# Patient Record
Sex: FEMALE | Race: WHITE
[De-identification: names, ages, dates, MRNs, and addresses within clinical notes are randomized per-mention and may not be internally consistent; named-entity substitution may affect disease eponyms.]

---

## 2021-04-02 ENCOUNTER — HOSPITAL ENCOUNTER (OUTPATIENT)
Dept: HOSPITAL 47 - RADCTMAIN | Age: 81
Discharge: HOME | End: 2021-04-02
Attending: THORACIC SURGERY (CARDIOTHORACIC VASCULAR SURGERY)
Payer: MEDICARE

## 2021-04-02 DIAGNOSIS — I70.0: ICD-10-CM

## 2021-04-02 DIAGNOSIS — R55: ICD-10-CM

## 2021-04-02 DIAGNOSIS — R91.1: ICD-10-CM

## 2021-04-02 DIAGNOSIS — J90: Primary | ICD-10-CM

## 2021-04-02 PROCEDURE — 93880 EXTRACRANIAL BILAT STUDY: CPT

## 2021-04-02 PROCEDURE — 71250 CT THORAX DX C-: CPT

## 2021-04-02 NOTE — CT
EXAMINATION TYPE: CT chest wo con

 

DATE OF EXAM: 4/2/2021

 

COMPARISON: None

 

HISTORY: atherosclerosis of aorta

 

CT DLP: 256 mGycm, Automated exposure control for dose reduction was used.

 

CONTRAST: Performed injected with 0 mL of Isovue 300.

 

TECHNIQUE: Axial images were obtained at 5 mm thick sections.  Reconstructed images are reviewed on [email or web address] computer in the coronal plane. 

 

FINDINGS: Portion of the thyroid visualized is normal.

 

There is a 0.3 cm nodule within the periphery of the left upper lobe. Series 4 image 21. This may be 
calcified. Monitoring is recommended.

 

Minimal bilateral pleural effusions are present.

 

 There are scattered small superior mediastinal lymph nodes. A few scattered present tracheal lymph n
odes are evident. There is calcification of the tracheobronchial tree. Calcifications within coronary
 vessels. The ascending aorta diameter at the level of the main pulmonary artery is 3.1 cm.  The main
 pulmonary artery diameter at the bifurcation is 3.2 cm.

 

Limited CT sections are obtained through the upper abdomen. Abdomen is essentially unremarkable.

 

IMPRESSIONS:

1. Minimal bilateral pleural effusions.

2. 0.4 cm nodule periphery left upper lobe. Follow-up exam in 6 months to confirm stability is recomm
ended.

## 2021-04-02 NOTE — US
EXAMINATION TYPE: US carotid duplex BILAT

 

DATE OF EXAM: 4/2/2021

 

COMPARISON: NONE

 

CLINICAL HISTORY: I70.0 Atherosclerosis of aorta,R55 Syncope. hx stroke. pre op.

 

EXAM MEASUREMENTS: 

 

RIGHT:  Peak Systolic Velocity (PSV) cm/sec

----- Right CCA:  76.1  

----- Right ICA:  76.1     

----- Right ECA:  66.1   

ICA/CCA ratio:  1.0    

 

RIGHT:  End Diastole cm/sec

----- Right CCA:  15.6   

----- Right ICA:  14.3      

----- Right ECA:  0     

 

LEFT:  Peak Systolic Velocity (PSV) cm/sec

----- Left CCA:  88.4  

----- Left ICA:  105.0   

----- Left ECA:  78.8  

ICA/CCA ratio:  1.2  

 

LEFT:  End Diastole cm/sec

----- Left CCA:  15.4  

----- Left ICA:  24.5   

----- Left ECA:  2.7 

 

VERTEBRALS (direction of flow):

Right Vertebral: Antegrade

Left Vertebral: Antegrade

 

Rhythm:  Arrhythmia

 

No elevated velocities.  No significant stenosis.  Small amount of plaque seen in right  bulb and pro
ximal right CCA.   

 

Grayscale, color Doppler, spectral Doppler imaging performed of the carotid arteries. Waveform analys
is does not show significant stenosis of the internal carotid arteries.

 

IMPRESSION:  No hemodynamic significant stenosis of the proximal internal carotid arteries by Doppler
 criteria, an indirect measurement of carotid stenosis   

 

 

Criteria for Assigning % of Stenosis / Diameter reduction

(Estimation based on the indirect measurements of the internal carotid artery velocities (ICA PSV).

1.  Normal (no stenosis)=ICA PSV < 125 cm/s: ratio < 2.0: ICA EDV<40 cm/s.

2. Less than 50% stenosis=ICA PSV < 125 cm/s: ratio < 2.0: ICA EDV<40 cm/s.

3.  50 to 69% stenosis=ICA PSV of 125 to 230 cm/s: ration 2.0 ? 4.0: ICA EDV  cm/s.

4.  Greater than 70% stenosis to near occlusion= ICA PSV > 230 cm/s: ratio > 4.0: ICA EDV > 100 cm/s.
 

5.  Near occlusion= ICA PSV velocities may be low or undetectable: variable ratio and ICA EDV.

6.  Total occlusion=unable to detect flow.

## 2021-04-13 ENCOUNTER — HOSPITAL ENCOUNTER (OUTPATIENT)
Dept: HOSPITAL 47 - LABPAT | Age: 81
Discharge: HOME | End: 2021-04-13
Attending: THORACIC SURGERY (CARDIOTHORACIC VASCULAR SURGERY)
Payer: MEDICARE

## 2021-04-13 DIAGNOSIS — Z01.810: Primary | ICD-10-CM

## 2021-04-13 DIAGNOSIS — I34.2: ICD-10-CM

## 2021-04-13 DIAGNOSIS — Z79.01: ICD-10-CM

## 2021-04-13 DIAGNOSIS — I25.10: ICD-10-CM

## 2021-04-13 DIAGNOSIS — I36.1: ICD-10-CM

## 2021-04-13 DIAGNOSIS — Z79.899: ICD-10-CM

## 2021-04-13 DIAGNOSIS — E11.9: ICD-10-CM

## 2021-04-13 LAB
ALBUMIN SERPL-MCNC: 4.4 G/DL (ref 3.5–5)
ALP SERPL-CCNC: 97 U/L (ref 38–126)
ALT SERPL-CCNC: 62 U/L (ref 4–34)
ANION GAP SERPL CALC-SCNC: 12 MMOL/L
APTT BLD: 24 SEC (ref 22–30)
AST SERPL-CCNC: 64 U/L (ref 14–36)
BUN SERPL-SCNC: 24 MG/DL (ref 7–17)
CALCIUM SPEC-MCNC: 9.4 MG/DL (ref 8.4–10.2)
CHLORIDE SERPL-SCNC: 102 MMOL/L (ref 98–107)
CHOLEST SERPL-MCNC: 109 MG/DL (ref ?–200)
CO2 SERPL-SCNC: 25 MMOL/L (ref 22–30)
ERYTHROCYTE [DISTWIDTH] IN BLOOD BY AUTOMATED COUNT: 4.39 M/UL (ref 3.8–5.4)
ERYTHROCYTE [DISTWIDTH] IN BLOOD: 18.4 % (ref 11.5–15.5)
GLUCOSE SERPL-MCNC: 333 MG/DL (ref 74–99)
GLUCOSE UR QL: (no result)
HBA1C MFR BLD: 8 % (ref 4–6)
HCT VFR BLD AUTO: 32.7 % (ref 34–46)
HDLC SERPL-MCNC: 25 MG/DL (ref 40–60)
HGB BLD-MCNC: 10 GM/DL (ref 11.4–16)
HYALINE CASTS UR QL AUTO: 4 /LPF (ref 0–2)
INR PPP: 1.1 (ref ?–1.2)
LDLC SERPL CALC-MCNC: 52 MG/DL (ref 0–99)
MAGNESIUM SPEC-SCNC: 1.1 MG/DL (ref 1.6–2.3)
MCH RBC QN AUTO: 22.8 PG (ref 25–35)
MCHC RBC AUTO-ENTMCNC: 30.6 G/DL (ref 31–37)
MCV RBC AUTO: 74.6 FL (ref 80–100)
PH UR: 6 [PH] (ref 5–8)
PLATELET # BLD AUTO: 370 K/UL (ref 150–450)
POTASSIUM SERPL-SCNC: 4.6 MMOL/L (ref 3.5–5.1)
PROT SERPL-MCNC: 6.9 G/DL (ref 6.3–8.2)
PT BLD: 12 SEC (ref 9–12)
RBC UR QL: 1 /HPF (ref 0–5)
SODIUM SERPL-SCNC: 139 MMOL/L (ref 137–145)
SP GR UR: 1.02 (ref 1–1.03)
SQUAMOUS UR QL AUTO: 1 /HPF (ref 0–4)
TRIGL SERPL-MCNC: 161 MG/DL (ref ?–150)
UROBILINOGEN UR QL STRIP: <2 MG/DL (ref ?–2)
WBC # BLD AUTO: 10.9 K/UL (ref 3.8–10.6)
WBC #/AREA URNS HPF: 21 /HPF (ref 0–5)

## 2021-04-13 PROCEDURE — 81001 URINALYSIS AUTO W/SCOPE: CPT

## 2021-04-13 PROCEDURE — 80061 LIPID PANEL: CPT

## 2021-04-13 PROCEDURE — 83735 ASSAY OF MAGNESIUM: CPT

## 2021-04-13 PROCEDURE — 80074 ACUTE HEPATITIS PANEL: CPT

## 2021-04-13 PROCEDURE — 87086 URINE CULTURE/COLONY COUNT: CPT

## 2021-04-13 PROCEDURE — 86901 BLOOD TYPING SEROLOGIC RH(D): CPT

## 2021-04-13 PROCEDURE — 36415 COLL VENOUS BLD VENIPUNCTURE: CPT

## 2021-04-13 PROCEDURE — 85027 COMPLETE CBC AUTOMATED: CPT

## 2021-04-13 PROCEDURE — 93005 ELECTROCARDIOGRAM TRACING: CPT

## 2021-04-13 PROCEDURE — 83036 HEMOGLOBIN GLYCOSYLATED A1C: CPT

## 2021-04-13 PROCEDURE — 86920 COMPATIBILITY TEST SPIN: CPT

## 2021-04-13 PROCEDURE — 85610 PROTHROMBIN TIME: CPT

## 2021-04-13 PROCEDURE — 87070 CULTURE OTHR SPECIMN AEROBIC: CPT

## 2021-04-13 PROCEDURE — 84443 ASSAY THYROID STIM HORMONE: CPT

## 2021-04-13 PROCEDURE — 85730 THROMBOPLASTIN TIME PARTIAL: CPT

## 2021-04-13 PROCEDURE — 86850 RBC ANTIBODY SCREEN: CPT

## 2021-04-13 PROCEDURE — 86900 BLOOD TYPING SEROLOGIC ABO: CPT

## 2021-04-13 PROCEDURE — 93970 EXTREMITY STUDY: CPT

## 2021-04-13 PROCEDURE — 93922 UPR/L XTREMITY ART 2 LEVELS: CPT

## 2021-04-13 PROCEDURE — 80053 COMPREHEN METABOLIC PANEL: CPT

## 2021-04-14 NOTE — P.VSCSTY
Greater Saphenous Vein Mapping





This is bilateral lower extremity greater saphenous vein mapping.





Date of service: 04/13/2021





Vein quality and ultrasound appearance: We see no intraluminal thrombus or wall 

changes.  Below mid thigh on the left appears small for use as conduit as well 

as some areas on the right at the knee and below..





Vein size  groin right : 5.0 x 4.1 groin left: 6.1 x 5.5





                High thigh right: 3.2 x 2.4 high thigh left: 3.9 x 3.8





                Mid thigh right: 3.2 x 2.4 mid thigh left: 2.3 x 3.9





                 Above-knee right: 3.2 x 3.1 above-knee left: 1.8 x 1.7





                  Below knee right: 3.0 x 2.0 below-knee left: 2.4 x 1.6





                   Mid calf right: 2.0 x 2.0 mid calf left: 1.8 x 1.4





                    Ankle right: 2.5 x 1.7 ankle left: 1.8 x 1.2





Impression: Areas of usable saphenous vein.  Most of right leg as well as mid 

thigh and above on the left.

## 2021-04-19 ENCOUNTER — HOSPITAL ENCOUNTER (OUTPATIENT)
Dept: HOSPITAL 47 - 2ORMAIN | Age: 81
Discharge: HOME | End: 2021-04-19
Attending: THORACIC SURGERY (CARDIOTHORACIC VASCULAR SURGERY)
Payer: MEDICARE

## 2021-04-19 VITALS
DIASTOLIC BLOOD PRESSURE: 76 MMHG | RESPIRATION RATE: 16 BRPM | SYSTOLIC BLOOD PRESSURE: 141 MMHG | TEMPERATURE: 97.6 F | HEART RATE: 83 BPM

## 2021-04-19 VITALS — BODY MASS INDEX: 20.5 KG/M2

## 2021-04-19 DIAGNOSIS — I34.2: Primary | ICD-10-CM

## 2021-04-19 DIAGNOSIS — I25.10: ICD-10-CM

## 2021-04-19 DIAGNOSIS — I36.1: ICD-10-CM

## 2021-04-19 DIAGNOSIS — Z53.09: ICD-10-CM

## 2021-04-19 LAB
ALBUMIN SERPL-MCNC: 4.1 G/DL (ref 3.5–5)
ALP SERPL-CCNC: 107 U/L (ref 38–126)
ALT SERPL-CCNC: 40 U/L (ref 4–34)
ANION GAP SERPL CALC-SCNC: 11 MMOL/L
AST SERPL-CCNC: 31 U/L (ref 14–36)
BUN SERPL-SCNC: 31 MG/DL (ref 7–17)
CALCIUM SPEC-MCNC: 9.5 MG/DL (ref 8.4–10.2)
CHLORIDE SERPL-SCNC: 102 MMOL/L (ref 98–107)
CO2 SERPL-SCNC: 26 MMOL/L (ref 22–30)
ERYTHROCYTE [DISTWIDTH] IN BLOOD BY AUTOMATED COUNT: 4.28 M/UL (ref 3.8–5.4)
ERYTHROCYTE [DISTWIDTH] IN BLOOD: 18.4 % (ref 11.5–15.5)
GLUCOSE BLD-MCNC: 203 MG/DL (ref 75–99)
GLUCOSE SERPL-MCNC: 198 MG/DL (ref 74–99)
HCT VFR BLD AUTO: 30.5 % (ref 34–46)
HGB BLD-MCNC: 9.9 GM/DL (ref 11.4–16)
MAGNESIUM SPEC-SCNC: 1.5 MG/DL (ref 1.6–2.3)
MCH RBC QN AUTO: 23.1 PG (ref 25–35)
MCHC RBC AUTO-ENTMCNC: 32.4 G/DL (ref 31–37)
MCV RBC AUTO: 71.3 FL (ref 80–100)
PLATELET # BLD AUTO: 307 K/UL (ref 150–450)
POTASSIUM SERPL-SCNC: 4.3 MMOL/L (ref 3.5–5.1)
PROT SERPL-MCNC: 6.7 G/DL (ref 6.3–8.2)
SODIUM SERPL-SCNC: 139 MMOL/L (ref 137–145)
WBC # BLD AUTO: 9.7 K/UL (ref 3.8–10.6)

## 2021-04-19 PROCEDURE — 86901 BLOOD TYPING SEROLOGIC RH(D): CPT

## 2021-04-19 PROCEDURE — 86920 COMPATIBILITY TEST SPIN: CPT

## 2021-04-19 PROCEDURE — 80053 COMPREHEN METABOLIC PANEL: CPT

## 2021-04-19 PROCEDURE — 86900 BLOOD TYPING SEROLOGIC ABO: CPT

## 2021-04-19 PROCEDURE — 86850 RBC ANTIBODY SCREEN: CPT

## 2021-04-19 PROCEDURE — 85027 COMPLETE CBC AUTOMATED: CPT

## 2021-04-19 PROCEDURE — 83735 ASSAY OF MAGNESIUM: CPT

## 2021-05-03 ENCOUNTER — HOSPITAL ENCOUNTER (OUTPATIENT)
Dept: HOSPITAL 47 - LABWHC1 | Age: 81
Discharge: HOME | End: 2021-05-03
Attending: THORACIC SURGERY (CARDIOTHORACIC VASCULAR SURGERY)
Payer: MEDICARE

## 2021-05-03 DIAGNOSIS — I34.0: ICD-10-CM

## 2021-05-03 DIAGNOSIS — R55: ICD-10-CM

## 2021-05-03 DIAGNOSIS — I25.10: Primary | ICD-10-CM

## 2021-05-03 LAB
ALBUMIN SERPL-MCNC: 4.1 G/DL (ref 3.5–5)
ALP SERPL-CCNC: 90 U/L (ref 38–126)
ALT SERPL-CCNC: 22 U/L (ref 4–34)
ANION GAP SERPL CALC-SCNC: 10 MMOL/L
APTT BLD: 22.3 SEC (ref 22–30)
AST SERPL-CCNC: 24 U/L (ref 14–36)
BUN SERPL-SCNC: 25 MG/DL (ref 7–17)
CALCIUM SPEC-MCNC: 9.9 MG/DL (ref 8.4–10.2)
CHLORIDE SERPL-SCNC: 104 MMOL/L (ref 98–107)
CO2 SERPL-SCNC: 26 MMOL/L (ref 22–30)
ERYTHROCYTE [DISTWIDTH] IN BLOOD BY AUTOMATED COUNT: 4.89 M/UL (ref 3.8–5.4)
ERYTHROCYTE [DISTWIDTH] IN BLOOD: 19.8 % (ref 11.5–15.5)
GLUCOSE SERPL-MCNC: 279 MG/DL (ref 74–99)
HCT VFR BLD AUTO: 37.6 % (ref 34–46)
HGB BLD-MCNC: 11.8 GM/DL (ref 11.4–16)
INR PPP: 1.1 (ref ?–1.2)
MAGNESIUM SPEC-SCNC: 1.3 MG/DL (ref 1.6–2.3)
MCH RBC QN AUTO: 24.2 PG (ref 25–35)
MCHC RBC AUTO-ENTMCNC: 31.4 G/DL (ref 31–37)
MCV RBC AUTO: 77 FL (ref 80–100)
PLATELET # BLD AUTO: 298 K/UL (ref 150–450)
POTASSIUM SERPL-SCNC: 4.8 MMOL/L (ref 3.5–5.1)
PROT SERPL-MCNC: 6.7 G/DL (ref 6.3–8.2)
PT BLD: 11.2 SEC (ref 9–12)
SODIUM SERPL-SCNC: 140 MMOL/L (ref 137–145)
WBC # BLD AUTO: 9.5 K/UL (ref 3.8–10.6)

## 2021-05-03 PROCEDURE — 85610 PROTHROMBIN TIME: CPT

## 2021-05-03 PROCEDURE — 85730 THROMBOPLASTIN TIME PARTIAL: CPT

## 2021-05-03 PROCEDURE — 83735 ASSAY OF MAGNESIUM: CPT

## 2021-05-03 PROCEDURE — 80053 COMPREHEN METABOLIC PANEL: CPT

## 2021-05-03 PROCEDURE — 85027 COMPLETE CBC AUTOMATED: CPT

## 2021-05-05 ENCOUNTER — HOSPITAL ENCOUNTER (INPATIENT)
Dept: HOSPITAL 47 - 2ORMAIN | Age: 81
DRG: 220 | End: 2021-05-05
Attending: THORACIC SURGERY (CARDIOTHORACIC VASCULAR SURGERY) | Admitting: THORACIC SURGERY (CARDIOTHORACIC VASCULAR SURGERY)
Payer: MEDICARE

## 2021-05-05 VITALS — TEMPERATURE: 99 F | DIASTOLIC BLOOD PRESSURE: 79 MMHG | SYSTOLIC BLOOD PRESSURE: 163 MMHG

## 2021-05-05 VITALS — RESPIRATION RATE: 30 BRPM | HEART RATE: 80 BPM

## 2021-05-05 VITALS — BODY MASS INDEX: 21 KG/M2

## 2021-05-05 DIAGNOSIS — I48.20: ICD-10-CM

## 2021-05-05 DIAGNOSIS — I25.2: ICD-10-CM

## 2021-05-05 DIAGNOSIS — K21.9: ICD-10-CM

## 2021-05-05 DIAGNOSIS — I25.10: ICD-10-CM
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
## 2021-05-05 DIAGNOSIS — E78.5: ICD-10-CM

## 2021-05-05 DIAGNOSIS — Z79.01: ICD-10-CM

## 2021-05-05 DIAGNOSIS — Z82.49: ICD-10-CM

## 2021-05-05 DIAGNOSIS — I05.2: Primary | ICD-10-CM

## 2021-05-05 DIAGNOSIS — I69.359: ICD-10-CM
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
## 2021-05-05 DIAGNOSIS — Z95.5: ICD-10-CM

## 2021-05-05 DIAGNOSIS — Z51.5: ICD-10-CM

## 2021-05-05 DIAGNOSIS — E87.2: ICD-10-CM

## 2021-05-05 DIAGNOSIS — Z88.5: ICD-10-CM

## 2021-05-05 DIAGNOSIS — Z20.822: ICD-10-CM

## 2021-05-05 DIAGNOSIS — Z66: ICD-10-CM

## 2021-05-05 DIAGNOSIS — R57.0: ICD-10-CM

## 2021-05-05 DIAGNOSIS — I97.418: ICD-10-CM

## 2021-05-05 DIAGNOSIS — I10: ICD-10-CM

## 2021-05-05 DIAGNOSIS — Z87.891: ICD-10-CM

## 2021-05-05 DIAGNOSIS — Z79.4: ICD-10-CM

## 2021-05-05 DIAGNOSIS — E11.9: ICD-10-CM

## 2021-05-05 DIAGNOSIS — D62: ICD-10-CM

## 2021-05-05 DIAGNOSIS — Z79.890: ICD-10-CM

## 2021-05-05 DIAGNOSIS — Z79.899: ICD-10-CM

## 2021-05-05 DIAGNOSIS — Z88.0: ICD-10-CM

## 2021-05-05 DIAGNOSIS — Z79.82: ICD-10-CM

## 2021-05-05 DIAGNOSIS — Z88.8: ICD-10-CM

## 2021-05-05 DIAGNOSIS — Z88.1: ICD-10-CM

## 2021-05-05 DIAGNOSIS — J44.9: ICD-10-CM

## 2021-05-05 LAB
ALBUMIN SERPL-MCNC: 1.6 G/DL (ref 3.5–5)
ALP SERPL-CCNC: 25 U/L (ref 38–126)
ALT SERPL-CCNC: 21 U/L (ref 4–34)
ANION GAP SERPL CALC-SCNC: 22 MMOL/L
APTT BLD: 109.7 SEC (ref 22–30)
AST SERPL-CCNC: 91 U/L (ref 14–36)
BUN SERPL-SCNC: 16 MG/DL (ref 7–17)
CA-I BLDA-MCNC: 2.4 MG/DL (ref 4.5–5.3)
CA-I BLDA-MCNC: 2.6 MG/DL (ref 4.5–5.3)
CA-I BLDA-MCNC: 3.9 MG/DL (ref 4.5–5.3)
CA-I BLDA-MCNC: 3.9 MG/DL (ref 4.5–5.3)
CA-I BLDA-MCNC: 4.1 MG/DL (ref 4.5–5.3)
CA-I BLDA-MCNC: 4.2 MG/DL (ref 4.5–5.3)
CA-I BLDA-MCNC: 4.3 MG/DL (ref 4.5–5.3)
CA-I BLDA-MCNC: 4.5 MG/DL (ref 4.5–5.3)
CA-I BLDA-MCNC: 4.5 MG/DL (ref 4.5–5.3)
CA-I BLDA-MCNC: 4.7 MG/DL (ref 4.5–5.3)
CA-I BLDA-MCNC: 4.8 MG/DL (ref 4.5–5.3)
CA-I BLDA-MCNC: 4.8 MG/DL (ref 4.5–5.3)
CALCIUM SPEC-MCNC: 5.7 MG/DL (ref 8.4–10.2)
CELLS COUNTED: 100
CHLORIDE SERPL-SCNC: 107 MMOL/L (ref 98–107)
CO2 BLDA-SCNC: 16 MMOL/L (ref 19–24)
CO2 BLDA-SCNC: 21 MMOL/L (ref 19–24)
CO2 BLDA-SCNC: 23 MMOL/L (ref 19–24)
CO2 BLDA-SCNC: 23 MMOL/L (ref 19–24)
CO2 BLDA-SCNC: 24 MMOL/L (ref 19–24)
CO2 BLDA-SCNC: 24 MMOL/L (ref 19–24)
CO2 BLDA-SCNC: 25 MMOL/L (ref 19–24)
CO2 BLDA-SCNC: 26 MMOL/L (ref 19–24)
CO2 BLDA-SCNC: 27 MMOL/L (ref 19–24)
CO2 BLDA-SCNC: 27 MMOL/L (ref 19–24)
CO2 BLDA-SCNC: 28 MMOL/L (ref 19–24)
CO2 SERPL-SCNC: 19 MMOL/L (ref 22–30)
DIGOXIN SERPL-MCNC: 0.7 NG/ML
EOSINOPHIL # BLD MANUAL: 0.22 K/UL (ref 0–0.7)
ERYTHROCYTE [DISTWIDTH] IN BLOOD BY AUTOMATED COUNT: 3.24 M/UL (ref 3.8–5.4)
ERYTHROCYTE [DISTWIDTH] IN BLOOD: 18.9 % (ref 11.5–15.5)
GLUCOSE BLD-MCNC: 147 MG/DL (ref 75–99)
GLUCOSE BLD-MCNC: 421 MG/DL (ref 75–99)
GLUCOSE BLD-MCNC: 430 MG/DL (ref 75–99)
GLUCOSE BLDA-MCNC: 158 MG/DL (ref 75–99)
GLUCOSE BLDA-MCNC: 184 MG/DL (ref 75–99)
GLUCOSE BLDA-MCNC: 187 MG/DL (ref 75–99)
GLUCOSE BLDA-MCNC: 193 MG/DL (ref 75–99)
GLUCOSE BLDA-MCNC: 195 MG/DL (ref 75–99)
GLUCOSE BLDA-MCNC: 198 MG/DL (ref 75–99)
GLUCOSE BLDA-MCNC: 199 MG/DL (ref 75–99)
GLUCOSE BLDA-MCNC: 208 MG/DL (ref 75–99)
GLUCOSE BLDA-MCNC: 211 MG/DL (ref 75–99)
GLUCOSE BLDA-MCNC: 217 MG/DL (ref 75–99)
GLUCOSE BLDA-MCNC: 242 MG/DL (ref 75–99)
GLUCOSE BLDA-MCNC: 248 MG/DL (ref 75–99)
GLUCOSE BLDA-MCNC: 257 MG/DL (ref 75–99)
GLUCOSE BLDA-MCNC: 261 MG/DL (ref 75–99)
GLUCOSE BLDA-MCNC: 366 MG/DL (ref 75–99)
GLUCOSE SERPL-MCNC: 370 MG/DL (ref 74–99)
HCO3 BLDA-SCNC: 15 MMOL/L (ref 21–25)
HCO3 BLDA-SCNC: 20 MMOL/L (ref 21–25)
HCO3 BLDA-SCNC: 22 MMOL/L (ref 21–25)
HCO3 BLDA-SCNC: 23 MMOL/L (ref 21–25)
HCO3 BLDA-SCNC: 24 MMOL/L (ref 21–25)
HCO3 BLDA-SCNC: 25 MMOL/L (ref 21–25)
HCO3 BLDA-SCNC: 26 MMOL/L (ref 21–25)
HCO3 BLDA-SCNC: 26 MMOL/L (ref 21–25)
HCT VFR BLD AUTO: 28.2 % (ref 34–46)
HCT VFR BLDA CALC: 20 % (ref 34–46)
HCT VFR BLDA CALC: 21 % (ref 34–46)
HCT VFR BLDA CALC: 22 % (ref 34–46)
HCT VFR BLDA CALC: 22 % (ref 34–46)
HCT VFR BLDA CALC: 23 % (ref 34–46)
HCT VFR BLDA CALC: 23 % (ref 34–46)
HCT VFR BLDA CALC: 24 % (ref 34–46)
HCT VFR BLDA CALC: 25 % (ref 34–46)
HCT VFR BLDA CALC: 26 % (ref 34–46)
HCT VFR BLDA CALC: 26 % (ref 34–46)
HCT VFR BLDA CALC: 28 % (ref 34–46)
HCT VFR BLDA CALC: 31 % (ref 34–46)
HCT VFR BLDA CALC: 31 % (ref 34–46)
HCT VFR BLDA CALC: 32 % (ref 34–46)
HGB BLD-MCNC: 9.1 GM/DL (ref 11.4–16)
INR PPP: 2 (ref ?–1.2)
LACTATE BLDA-MCNC: 1.7 MMOL/L (ref 0.5–1.6)
LACTATE BLDA-MCNC: 1.7 MMOL/L (ref 0.5–1.6)
LACTATE BLDA-MCNC: 10.8 MMOL/L (ref 0.5–1.6)
LACTATE BLDA-MCNC: 13.5 MMOL/L (ref 0.5–1.6)
LACTATE BLDA-MCNC: 2.2 MMOL/L (ref 0.5–1.6)
LACTATE BLDA-MCNC: 2.7 MMOL/L (ref 0.5–1.6)
LACTATE BLDA-MCNC: 2.8 MMOL/L (ref 0.5–1.6)
LACTATE BLDA-MCNC: 3 MMOL/L (ref 0.5–1.6)
LACTATE BLDA-MCNC: 3.1 MMOL/L (ref 0.5–1.6)
LACTATE BLDA-MCNC: 3.1 MMOL/L (ref 0.5–1.6)
LACTATE BLDA-MCNC: 3.3 MMOL/L (ref 0.5–1.6)
LACTATE BLDA-MCNC: 3.3 MMOL/L (ref 0.5–1.6)
LACTATE BLDA-MCNC: 3.4 MMOL/L (ref 0.5–1.6)
LACTATE BLDA-MCNC: 5.7 MMOL/L (ref 0.5–1.6)
LACTATE BLDA-MCNC: 7.5 MMOL/L (ref 0.5–1.6)
LYMPHOCYTES # BLD MANUAL: 4.29 K/UL (ref 1–4.8)
MAGNESIUM SPEC-SCNC: 1.5 MG/DL (ref 1.6–2.3)
MAGNESIUM SPEC-SCNC: 3.5 MG/DL (ref 1.6–2.3)
MCH RBC QN AUTO: 28.1 PG (ref 25–35)
MCHC RBC AUTO-ENTMCNC: 32.3 G/DL (ref 31–37)
MCV RBC AUTO: 87.1 FL (ref 80–100)
METAMYELOCYTES # BLD: 0.22 K/UL
MONOCYTES # BLD MANUAL: 0.22 K/UL (ref 0–1)
NEUTROPHILS NFR BLD MANUAL: 41 %
NEUTS SEG # BLD MANUAL: 6 K/UL (ref 1.3–7.7)
PCO2 BLDA: 33 MMHG (ref 35–45)
PCO2 BLDA: 35 MMHG (ref 35–45)
PCO2 BLDA: 35 MMHG (ref 35–45)
PCO2 BLDA: 38 MMHG (ref 35–45)
PCO2 BLDA: 40 MMHG (ref 35–45)
PCO2 BLDA: 41 MMHG (ref 35–45)
PCO2 BLDA: 41 MMHG (ref 35–45)
PCO2 BLDA: 42 MMHG (ref 35–45)
PCO2 BLDA: 45 MMHG (ref 35–45)
PCO2 BLDA: 45 MMHG (ref 35–45)
PCO2 BLDA: 53 MMHG (ref 35–45)
PCO2 BLDA: 54 MMHG (ref 35–45)
PCO2 BLDA: 57 MMHG (ref 35–45)
PCO2 BLDA: 66 MMHG (ref 35–45)
PH BLDA: 7.05 [PH] (ref 7.35–7.45)
PH BLDA: 7.18 [PH] (ref 7.35–7.45)
PH BLDA: 7.27 [PH] (ref 7.35–7.45)
PH BLDA: 7.29 [PH] (ref 7.35–7.45)
PH BLDA: 7.32 [PH] (ref 7.35–7.45)
PH BLDA: 7.32 [PH] (ref 7.35–7.45)
PH BLDA: 7.33 [PH] (ref 7.35–7.45)
PH BLDA: 7.35 [PH] (ref 7.35–7.45)
PH BLDA: 7.36 [PH] (ref 7.35–7.45)
PH BLDA: 7.38 [PH] (ref 7.35–7.45)
PH BLDA: 7.38 [PH] (ref 7.35–7.45)
PH BLDA: 7.39 [PH] (ref 7.35–7.45)
PH BLDA: 7.41 [PH] (ref 7.35–7.45)
PH BLDA: 7.44 [PH] (ref 7.35–7.45)
PH BLDA: 7.45 [PH] (ref 7.35–7.45)
PLATELET # BLD AUTO: 130 K/UL (ref 150–450)
PO2 BLDA: 322 MMHG (ref 83–108)
PO2 BLDA: 379 MMHG (ref 83–108)
PO2 BLDA: 381 MMHG (ref 83–108)
PO2 BLDA: 401 MMHG (ref 83–108)
PO2 BLDA: 408 MMHG (ref 83–108)
PO2 BLDA: 412 MMHG (ref 83–108)
PO2 BLDA: 57 MMHG (ref 83–108)
PO2 BLDA: 91 MMHG (ref 83–108)
PO2 BLDA: >420 MMHG (ref 83–108)
POTASSIUM BLDA-SCNC: 3.2 MMOL/L (ref 3.4–4.5)
POTASSIUM BLDA-SCNC: 3.3 MMOL/L (ref 3.4–4.5)
POTASSIUM BLDA-SCNC: 3.5 MMOL/L (ref 3.4–4.5)
POTASSIUM BLDA-SCNC: 3.6 MMOL/L (ref 3.4–4.5)
POTASSIUM BLDA-SCNC: 3.7 MMOL/L (ref 3.4–4.5)
POTASSIUM BLDA-SCNC: 3.8 MMOL/L (ref 3.4–4.5)
POTASSIUM BLDA-SCNC: 3.9 MMOL/L (ref 3.4–4.5)
POTASSIUM BLDA-SCNC: 4 MMOL/L (ref 3.4–4.5)
POTASSIUM BLDA-SCNC: 4.1 MMOL/L (ref 3.4–4.5)
POTASSIUM BLDA-SCNC: 4.2 MMOL/L (ref 3.4–4.5)
POTASSIUM BLDA-SCNC: 4.5 MMOL/L (ref 3.4–4.5)
POTASSIUM BLDA-SCNC: 4.6 MMOL/L (ref 3.4–4.5)
POTASSIUM BLDA-SCNC: 4.7 MMOL/L (ref 3.4–4.5)
POTASSIUM BLDA-SCNC: 4.8 MMOL/L (ref 3.4–4.5)
POTASSIUM BLDA-SCNC: 5 MMOL/L (ref 3.4–4.5)
POTASSIUM SERPL-SCNC: 3.2 MMOL/L (ref 3.5–5.1)
PROT SERPL-MCNC: 2.8 G/DL (ref 6.3–8.2)
PT BLD: 19.3 SEC (ref 9–12)
SODIUM BLDA-SCNC: 132 MMOL/L (ref 135–146)
SODIUM BLDA-SCNC: 134 MMOL/L (ref 135–146)
SODIUM BLDA-SCNC: 135 MMOL/L (ref 135–146)
SODIUM BLDA-SCNC: 136 MMOL/L (ref 135–146)
SODIUM BLDA-SCNC: 137 MMOL/L (ref 135–146)
SODIUM BLDA-SCNC: 137 MMOL/L (ref 135–146)
SODIUM BLDA-SCNC: 138 MMOL/L (ref 135–146)
SODIUM BLDA-SCNC: 138 MMOL/L (ref 135–146)
SODIUM BLDA-SCNC: 139 MMOL/L (ref 135–146)
SODIUM BLDA-SCNC: 139 MMOL/L (ref 135–146)
SODIUM BLDA-SCNC: 140 MMOL/L (ref 135–146)
SODIUM BLDA-SCNC: 143 MMOL/L (ref 135–146)
SODIUM BLDA-SCNC: 148 MMOL/L (ref 135–146)
SODIUM SERPL-SCNC: 148 MMOL/L (ref 137–145)
WBC # BLD AUTO: 11 K/UL (ref 3.8–10.6)

## 2021-05-05 PROCEDURE — 86920 COMPATIBILITY TEST SPIN: CPT

## 2021-05-05 PROCEDURE — 3E033XZ INTRODUCTION OF VASOPRESSOR INTO PERIPHERAL VEIN, PERCUTANEOUS APPROACH: ICD-10-PCS

## 2021-05-05 PROCEDURE — 5A1221Z PERFORMANCE OF CARDIAC OUTPUT, CONTINUOUS: ICD-10-PCS

## 2021-05-05 PROCEDURE — 30243R1 TRANSFUSION OF NONAUTOLOGOUS PLATELETS INTO CENTRAL VEIN, PERCUTANEOUS APPROACH: ICD-10-PCS

## 2021-05-05 PROCEDURE — 30243K1 TRANSFUSION OF NONAUTOLOGOUS FROZEN PLASMA INTO CENTRAL VEIN, PERCUTANEOUS APPROACH: ICD-10-PCS

## 2021-05-05 PROCEDURE — 02L70CK OCCLUSION OF LEFT ATRIAL APPENDAGE WITH EXTRALUMINAL DEVICE, OPEN APPROACH: ICD-10-PCS

## 2021-05-05 PROCEDURE — 86901 BLOOD TYPING SEROLOGIC RH(D): CPT

## 2021-05-05 PROCEDURE — 86891 AUTOLOGOUS BLOOD OP SALVAGE: CPT

## 2021-05-05 PROCEDURE — 82805 BLOOD GASES W/O2 SATURATION: CPT

## 2021-05-05 PROCEDURE — 06BP4ZZ EXCISION OF RIGHT SAPHENOUS VEIN, PERCUTANEOUS ENDOSCOPIC APPROACH: ICD-10-PCS

## 2021-05-05 PROCEDURE — 85730 THROMBOPLASTIN TIME PARTIAL: CPT

## 2021-05-05 PROCEDURE — 85520 HEPARIN ASSAY: CPT

## 2021-05-05 PROCEDURE — 88311 DECALCIFY TISSUE: CPT

## 2021-05-05 PROCEDURE — 80053 COMPREHEN METABOLIC PANEL: CPT

## 2021-05-05 PROCEDURE — 0W380ZZ CONTROL BLEEDING IN CHEST WALL, OPEN APPROACH: ICD-10-PCS

## 2021-05-05 PROCEDURE — 36430 TRANSFUSION BLD/BLD COMPNT: CPT

## 2021-05-05 PROCEDURE — 83735 ASSAY OF MAGNESIUM: CPT

## 2021-05-05 PROCEDURE — 80162 ASSAY OF DIGOXIN TOTAL: CPT

## 2021-05-05 PROCEDURE — 88305 TISSUE EXAM BY PATHOLOGIST: CPT

## 2021-05-05 PROCEDURE — B24BZZ4 ULTRASONOGRAPHY OF HEART WITH AORTA, TRANSESOPHAGEAL: ICD-10-PCS

## 2021-05-05 PROCEDURE — 30243N1 TRANSFUSION OF NONAUTOLOGOUS RED BLOOD CELLS INTO CENTRAL VEIN, PERCUTANEOUS APPROACH: ICD-10-PCS

## 2021-05-05 PROCEDURE — 86900 BLOOD TYPING SEROLOGIC ABO: CPT

## 2021-05-05 PROCEDURE — 85027 COMPLETE CBC AUTOMATED: CPT

## 2021-05-05 PROCEDURE — 82330 ASSAY OF CALCIUM: CPT

## 2021-05-05 PROCEDURE — 021009W BYPASS CORONARY ARTERY, ONE ARTERY FROM AORTA WITH AUTOLOGOUS VENOUS TISSUE, OPEN APPROACH: ICD-10-PCS

## 2021-05-05 PROCEDURE — 85610 PROTHROMBIN TIME: CPT

## 2021-05-05 PROCEDURE — 86850 RBC ANTIBODY SCREEN: CPT

## 2021-05-05 PROCEDURE — 85025 COMPLETE CBC W/AUTO DIFF WBC: CPT

## 2021-05-05 PROCEDURE — 02PA08Z REMOVAL OF ZOOPLASTIC TISSUE FROM HEART, OPEN APPROACH: ICD-10-PCS

## 2021-05-05 PROCEDURE — 02RG08Z REPLACEMENT OF MITRAL VALVE WITH ZOOPLASTIC TISSUE, OPEN APPROACH: ICD-10-PCS

## 2021-05-05 PROCEDURE — 5A02210 ASSISTANCE WITH CARDIAC OUTPUT USING BALLOON PUMP, CONTINUOUS: ICD-10-PCS

## 2021-05-05 PROCEDURE — 87635 SARS-COV-2 COVID-19 AMP PRB: CPT

## 2021-05-05 PROCEDURE — 02UG07Z SUPPLEMENT MITRAL VALVE WITH AUTOLOGOUS TISSUE SUBSTITUTE, OPEN APPROACH: ICD-10-PCS

## 2021-05-27 NOTE — OP
OPERATIVE REPORT



DATE OF OPERATION:

05/05/2021



ASSISTANTS:

1. JASS Ivey.

2. Janeen Haji.



PREOPERATIVE DIAGNOSIS:

Mitral stenosis and mitral regurgitation.



POSTOPERATIVE DIAGNOSIS:

Mitral stenosis and mitral regurgitation.



PROCEDURE:

1. Mitral valve replacement with a 29 mm Araiza bioprosthetic mitral valve.

2. Coronary artery bypass grafting x1 with reverse saphenous vein graft off the 
aorta

    to the left anterior descending artery with right lower extremity greater 
saphenous

    vein endoscopic vein harvesting,.

3. Clip ligation of the left atrial appendage with a 35 mm AtriClip followed by 
repair

    of AV groove disruption.

4. Explant of Vish's bioprosthetic mitral valve.

5. Mitral valve re-replacement with a 29 mm Medtronic Mosaic bioprosthetic 
mitral

    valve.

6. Placement of a left femoral intraaortic balloon pump.

7. Intraoperative transesophageal echocardiogram.



ANESTHESIA:

General.



BLOOD LOSS:

About 500 mL.



SUMMARY:

Patient was brought to the operating room, placed in supine position.  Following

administration of a general endotracheal anesthetic, placement of a [place] 
catheter

and arterial line, adequate IV access and a Land catheter, the patient was 
carefully

prepped and draped in normal sterile fashion using chlorhexidine paint and 
sterile

towels.  Greater saphenous vein was harvested from the right lower extremity 
with

endovascular vein-harvesting technique.  All branches were doubly tied and 
divided and

the incisions closed in 2 layers.  Midline incision in the chest was made, 
sternum

divided. Pericardium was opened.  Heart size was mildly enlarged.  The aorta was
soft.

The patient was heparinized to an ACT of greater than 480.  The aorta and two 
single-

stage venous cannulas were placed. Antegrade and retrograde cardioplegic 
catheters were

positioned.  The patient was placed on bypass, cross-clamp placed, heart 
arrested with

one liter of antegrade followed by 500 mL retrograde cardioplegia. Retrograde

cardioplegia was delivered, 300 to 500 mL at the end of each 20-minute interval.
 First

the base of the left atrial appendage was measured. A 35 mm AtriClip was secured
at the

base, officially obliterating the left atrial appendage.  A single distal 
anastomosis

was constructed between the reverse saphenous vein graft and the proximal mid 
left

anterior descending artery using a 7-0 Prolene running suture.  Caliber of this 
vessel was 1.75 mm.  A

single proximal anastomosis was constructed on the ascending aorta using 6-0 
Prolene

running suture.  Next the left atrium was entered at the junction of the right 
superior

pulmonary vein.  A handheld retractor was placed.  Mitral valve was identified. 
It was

very heavily calcified and a rheumatic mitral stenosis type valve.  The valve 
was

carefully excised, and due to the thickening and foreshortening of all the 
chordae, the

chordae were excised down to the papillary muscle heads.  Once the valve was 
excised it

was sent to Pathology.  It sized to a 29 mm Araiza bioprosthetic mitral valve. 
The

valve was brought into the field and prepared in the usual fashion as 2-0 Tycron

pledgeted sutures were placed circumferentially, atrially based. These were then
passed

through the sewing cuff of the valve, which was then seated. As it was being 
seated,

the valve was not locked in position and therefore it was then locked as it was 
placed

into the mitral anulus. Once in the mitral anulus, all sutures were secured, 
tied and

cut using the CureVacot ligature system device.  At this point the atrium was then

irrigated out and closed using a 4-0 Prolene pledgeted suture, vertical mattress

followed by an over-and-over stitch from both sites.  At this point, complete 
de-airing

maneuvers were performed 3 times, cross-clamp was then removed, and patient was 
then

brought off bypass. She came off bypass uneventfully with excellent 
hemodynamics.  At

this point, ALLEN, however, showed that one of the leaflets was not closing to the

midline appropriately and there was significant central mitral regurgitation. 
Being

unable to fully diagnose it on ALLEN, including having interventional cardiologist
Dr. Monique come and look at the ALLEN, the patient was then placed back on bypass. 
The

cross-clamp was placed.  The heart was re-arrested and the left atrium was 
opened.  All

sutures were cut and the valve was then removed. The left atrium and ventricle 
were

irrigated out copiously with cold saline.  There was definitely an indentation 
on one

of the leaflets of the bioprosthetic valve on the undersurface. At this point, 
due to

the very fragile nature of her anulus and annular type tissues, a piece of 
pericardium

was placed and then 2-0 Tycron pledgeted sutures were once again placed

circumferentially and a 29 mm Medtronic Mosaic bioprosthetic aortic valve was 
brought

into the field and prepared in the usual fashion.  This was a sensed device and 
the

posts were cinched to the midline.  All sutures were passed through the sewing 
cuff of

the valve, which was then seated and seated well, and all sutures were then 
tied,

secured and cut using the [employer] ligature system device.  The atrium was then 
closed

again using the same double-layered pledgeted 4-0 Prolene vertical mattress 
followed by

an over-and-over stitch on both sides. The patient was placed head down. 
Complete de-

airing maneuvers were performed 3 times.  At this point, the cross-clamp was re-

removed.  The patient was then re-perfused for a period of time and there was 
noted to

be some bright red blood coming from the posterior aspect of the heart. Using 
large

pieces of felt strips and a 3-0 Prolene on a very large needle, large bites in 
the area

of the AV groove were taken along with using TachoSil patches, the bleeding 
stopped.

The patient came off bypass; however, at this point she was very acidotic and 
required

pressor therapy. An intra-aortic balloon pump was placed via the left femoral 
artery to

help with counterpulsation.  Protamine was delivered, patient decannulated.  
Atrial and

ventricular pacing wires were placed.  Mediastinal and left pleural chest tubes 
were

placed.  At this point the sternum was closed with six #6 sternal wires.  Skin,

subcutaneous tissue and fascia were closed in 3 layers.  The valve on ALLEN was

functioning normally and well.  However, the left ventricular function was 
decreased.

The patient remained quite unstable and was transported to the intensive care 
unit in

critical but stable condition on multiple pressors, intra-aortic balloon pump 
and

Primacor.



MMODL / IJN: [id number] / Job#: [id number]

[facility]

## 2021-05-28 NOTE — P.DS
Providers


Date of admission: 


21 06:10





Expected date of discharge: 21 (Patient )


Attending physician: 


Guillermo Irvin





Consults: 





                                        





21 18:26


Consult Physician Routine 


   Consulting Provider: David Waite


   Consult Reason/Comments: Intensivist Consult: post cardiac surgery


   Do you want consulting provider notified?: Yes


Consult Physician Routine 


   Consulting Provider: Joe Hale


   Consult Reason/Comments: med mgmt


   Do you want consulting provider notified?: Yes, Notify in am


Consult Physician Routine 


   Consulting Provider: Karlene Armando


   Consult Reason/Comments: Cardiologist Consult: post cardiac surgery


   Do you want consulting provider notified?: Yes











Primary care physician: 


[facility] Course: 





FINAL DIAGNOSIS: 


1.  Mitral valve stenosis and mitral regurgitation with previous balloon 

valvuloplasty in 


2.  Coronary artery disease with previous myocardial infarction and PCI to the 

LAD in 


3.  Hypertension


4.  Hyperlipidemia


5.  Insulin-dependent diabetes with preoperative hemoglobin A1c 8%


6.  Chronic atrial fibrillation on Eliquis for anticoagulation, last dose 

2021


7.  Previous CVA in 2020


8.  Chronic anemia


9.  Previous tobacco dependence


10.  Mild COPD with preoperative FEV1 73% of predicted


11.  Family history of premature coronary artery disease on both parents side





PRINCIPAL PROCEDURE: 


1.  Mitral valve replacement with 29 mm Araiza bioprosthetic mitral valve


2.  Coronary artery bypass grafting 1 with reverse saphenous vein graft off the

aorta to the left anterior descending artery with right lower extremity greater 

saphenous vein endoscopic vein harvesting


3.  Clip ligation of the left atrial appendage with a 35 mm AtriClip followed by

repair of AV groove disruption


4.  Explant of Araiza bioprosthetic mitral valve


5.  Mitral valve re-replacement with a 29 mm Medtronic Mosaic bioprosthetic 

mitral valve


6.  Placement of a left femoral intra-aortic balloon pump


7.  Intraoperative transesophageal echocardiogram





HISTORY OF PRESENT ILLNESS: This was a 80-year-old female who followed on an 

outpatient basis with Dr. Hale for primary care and Dr. JOYCE Armando for 

cardiology.  She had been experiencing increasing shortness of breath and was 

recommended to undergo heart catheterization and transesophageal echocardiogram.

 Catheterization revealed greater than 70% stenosis to the left anterior 

descending coronary artery.  Echocardiogram revealed moderate to severe mitral 

stenosis with calcification of the mitral valve.  The patient was referred to 

Dr. Irvin from cardiothoracic surgery.  She was recommended to undergo single 

vessel CABG along with mitral valve replacement.  The usual perioperative course

was discussed in detail with the patient and her family, all risks and benefits 

were explained, all questions were answered, and consent was obtained to proceed

with surgery.  The patient was kept on maximal medical therapy to return as an 

outpatient for surgery after obtaining dental clearance.





HOSPITAL COURSE: The patient was brought to the hospital on 2021, taken to

the preoperative area, prepared in the usual fashion, and subsequently taken to 

the operating room where Dr. Irvin performed single vessel CABG and mitral valve

replacement.  The patient had a adrian operative course as noted in the operative

report with re-replacement of the mitral valve and placement of intra-aortic 

balloon pump.  Upon completion of surgery the patient was transferred to the 

cardiovascular intensive care unit where she continued to deteriorate despite 

all of our best efforts.  Family was at the bedside and updated continuously, 

agreed to make the patient comfort care.  Unfortunately the patient was unable 

to survive and she  in the intensive care unit the night of surgery.  














Plan - Discharge Summary


Discharge Rx Participant: No


New Discharge Prescriptions: 


No Action


   Repaglinide [Prandin] 2 mg PO AC-BID


   Cyanocobalamin [Vitamin B-12] 500 mcg PO DAILY


   ALPRAZolam [Xanax] 0.5 tab PO Q8HR PRN


     PRN Reason: Anxiety


   Isosorbide Mononitrate [Isosorbide Mononitrate ER] 30 mg PO DAILY


   Torsemide [Demadex] 20 mg PO DAILY


   Apixaban [Eliquis] 5 mg PO BID


   Ferrous Sulfate [Feosol] 325 mg PO DAILY


   Insulin Aspart (Niacinamide) [Fiasp 100 Unit/ml Flextouch] See Protocol SQ AS

DIRECTED PRN


     PRN Reason: elevated blood sugar


   Aspirin 325 mg PO DAILY


   metFORMIN HCL [Glucophage] 850 mg PO QAM


   Metoprolol Tartrate [Lopressor] 50 mg PO BID


   lisinopriL [Zestril] 2.5 mg PO DAILY


   Digoxin [Lanoxin] 125 mcg PO Q48H


   Atorvastatin [Lipitor] 20 mg PO DAILY


   Levothyroxine Sodium [Synthroid] 75 mcg PO DAILY


   Pantoprazole Sodium [Protonix] 40 mg PO DAILY


   Multivit-Min/Iron/Folic/Lutein [Centrum Silver Women Tablet] 1 each PO DAILY


   metFORMIN HCL [Glucophage] 425 mg PO AC-SUPPER


   Magnesium Oxide 400 mg PO QID


Discharge Medication List





ALPRAZolam [Xanax] 0.5 tab PO Q8HR PRN 21 [History]


Apixaban [Eliquis] 5 mg PO BID 21 [History]


Aspirin 325 mg PO DAILY 21 [History]


Atorvastatin [Lipitor] 20 mg PO DAILY 21 [History]


Cyanocobalamin [Vitamin B-12] 500 mcg PO DAILY 21 [History]


Digoxin [Lanoxin] 125 mcg PO Q48H 21 [History]


Isosorbide Mononitrate [Isosorbide Mononitrate ER] 30 mg PO DAILY 21 

[History]


Levothyroxine Sodium [Synthroid] 75 mcg PO DAILY 21 [History]


Metoprolol Tartrate [Lopressor] 50 mg PO BID 21 [History]


Multivit-Min/Iron/Folic/Lutein [Centrum Silver Women Tablet] 1 each PO DAILY 

21 [History]


Pantoprazole Sodium [Protonix] 40 mg PO DAILY 21 [History]


Repaglinide [Prandin] 2 mg PO AC-BID 21 [History]


Torsemide [Demadex] 20 mg PO DAILY 21 [History]


lisinopriL [Zestril] 2.5 mg PO DAILY 21 [History]


metFORMIN HCL [Glucophage] 850 mg PO QAM 21 [History]


Ferrous Sulfate [Feosol] 325 mg PO DAILY 21 [History]


metFORMIN HCL [Glucophage] 425 mg PO AC-SUPPER 21 [History]


Insulin Aspart (Niacinamide) [Fiasp 100 Unit/ml Flextouch] See Protocol SQ AS 

DIRECTED PRN 21 [History]


Magnesium Oxide 400 mg PO QID 21 [History]








Discharge Disposition: 





- Preliminary Cause of Death


Preliminary Cause of Death: metabolic acidosis, multisystem organ failure

## 2021-06-02 NOTE — CDI
Documentation Clarification Form



Date: 2021 09:10:57 AM

From: Fanny Barry CCS, CCDS

Phone: [phone]

MRN: [id number]

Admit Date: 2021 06:10:00 AM

Patient Name: Kay Elliott

Visit Number: [id number]

Discharge Date:  2021 09:20:00 PM





ATTENTION: The Clinical Documentation Specialists (CDI) and [facility] Coding Staff 
appreciate your assistance in clarifying documentation. Please respond to the 
clarification below the line at the bottom and electronically sign. The CDI & 
[facility] Coding staff will review the response and follow-up if needed. Please note: 
Queries are made part of the Legal Health Record. If you have any questions, 
please contact the author of this message via ITS.



Dr. Guillermo Irvin:



Chronic Anemia is documented in the  Discharge Summary.

The patient developed bright red blood from the posterior aspect of the heart 
status post MVR, bleeding was controlled with large pieces of felt strip patches
and TachoSil patches per the OR report. 



Additional specificity regarding the type and acuity of anemia is requested.



History/Risk Factors: Mitral Valve Stenosis, CAD w/previous MI & PCI, 
Hypertension, Hyperlipidemia, COPD, IDDM II, GERD, CVA, Former smoker.

Clinical indicators: Presented for elective CABG x1 & MVR on . 

Hemoglobin : 9.1

Hematocrit : 28.2

(Patient  shortly after surgery, no other labs for comparison)



Treatment : Transfused 1 unit Platelets, 2 units FFP, 4 units PRBCs        



Please clarify the type and acuity of anemia:



[  ] Acute blood loss anemia

[ X ] Acute on chronic blood loss anemia

[  ] Chronic blood loss anemia

[  ] Iron deficiency anemia

[  ] Hemolytic anemia

[  ] Unable to determine

[  ] Other, please specify ___________



(Template Last Revised: 2021)

___________________________________________________________________________

MTDD

## 2021-06-02 NOTE — CDI
Documentation Clarification Form



Date: 2021 09:19:33 AM

From: Fanny Barry CCS, CCDS

Phone: [phone]

MRN: [id number]

Admit Date: 2021 06:10:00 AM

Patient Name: Kay Elliott

Visit Number: [id number]

Discharge Date:  2021 09:20:00 PM





ATTENTION: The Clinical Documentation Specialists (CDI) and [facility] Coding Staff 
appreciate your assistance in clarifying documentation. Please respond to the 
clarification below the line at the bottom and electronically sign. The CDI & 
[facility] Coding staff will review the response and follow-up if needed. Please note: 
Queries are made part of the Legal Health Record. If you have any questions, 
please contact the author of this message via ITS.



Dr. Guillermo Irvin:



The patient developed bleeding requiring repair intra-operatively status post 
MVR w/intra-operative revision and CABG x1 and received Vasopressors and 7 units
blood. 



Additional clarification regarding a related diagnoses is requested.



History/Risk Factors: Mitral Valve Stenosis, CAD w/previous MI & PCI, 
Hypertension, Hyperlipidemia, COPD, IDDM II, GERD, CVA, Former smoker.

Clinical indicators: Presented for elective CABG x1 & MVR on . 

 Preop VS: T 99.0, P 59, R 16 - 24, /79, PO 94 RA

 Postop VS: T P 80, R 16, Arterial BP 72/28, PO 64 (intubated from surgery)

Hemoglobin : 9.1

Hematocrit : 28.2

(Patient  shortly after surgery, no other labs for comparison)



Treatment : Transfused 1 unit Platelets, 2 units FFP, 4 units PRBCs, IV 
Phenylephrine in NaCl, IV Papaverine in Na Cl, IV Levophed, IV Epinephrine, IV 
Dextrose/Water w/Na Bicarb, IV Tylenol, IV Albumin.



Please clarify if the following was present: 



[  ] Hypovolemic Shock

[X  ] Cardiogenic Shock

[  ] Other, please specify_________

[  ] Unable to determine





(Template Last Revised: 2021)

___________________________________________________________________________

MTDD